# Patient Record
Sex: FEMALE | Race: WHITE | NOT HISPANIC OR LATINO
[De-identification: names, ages, dates, MRNs, and addresses within clinical notes are randomized per-mention and may not be internally consistent; named-entity substitution may affect disease eponyms.]

---
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
## 2024-03-15 ENCOUNTER — OFFICE (OUTPATIENT)
Dept: URBAN - METROPOLITAN AREA CLINIC 109 | Facility: CLINIC | Age: 24
Setting detail: OPHTHALMOLOGY
End: 2024-03-15
Payer: COMMERCIAL

## 2024-03-15 DIAGNOSIS — H10.9: ICD-10-CM

## 2024-03-15 DIAGNOSIS — H16.221: ICD-10-CM

## 2024-03-15 PROCEDURE — 92004 COMPRE OPH EXAM NEW PT 1/>: CPT | Performed by: OPHTHALMOLOGY

## 2024-03-15 ASSESSMENT — REFRACTION_MANIFEST
OD_AXIS: 40
OD_CYLINDER: -1.75
OD_VA1: 20/20
OD_SPHERE: -6.00

## 2024-03-15 ASSESSMENT — REFRACTION_CURRENTRX
OD_OVR_VA: 20/
OD_AXIS: 21
OS_SPHERE: -7.00
OS_AXIS: 155
OS_OVR_VA: 20/
OD_SPHERE: -6.50
OD_CYLINDER: -1.50
OS_CYLINDER: -1.75

## 2024-03-15 ASSESSMENT — SPHEQUIV_DERIVED: OD_SPHEQUIV: -6.875

## 2024-03-15 ASSESSMENT — LID EXAM ASSESSMENTS: OD_COMMENTS: NO FOREIGN BODY WITH LID EVERTED

## 2024-04-15 ENCOUNTER — OFFICE (OUTPATIENT)
Dept: URBAN - METROPOLITAN AREA CLINIC 12 | Facility: CLINIC | Age: 24
Setting detail: OPHTHALMOLOGY
End: 2024-04-15
Payer: COMMERCIAL

## 2024-04-15 DIAGNOSIS — H16.223: ICD-10-CM

## 2024-04-15 DIAGNOSIS — H10.9: ICD-10-CM

## 2024-04-15 PROCEDURE — 99213 OFFICE O/P EST LOW 20 MIN: CPT | Performed by: STUDENT IN AN ORGANIZED HEALTH CARE EDUCATION/TRAINING PROGRAM

## 2024-04-15 ASSESSMENT — LID EXAM ASSESSMENTS: OD_COMMENTS: NO FOREIGN BODY WITH LID EVERTED

## 2024-08-20 ENCOUNTER — NON-APPOINTMENT (OUTPATIENT)
Age: 24
End: 2024-08-20

## 2024-08-20 ENCOUNTER — APPOINTMENT (OUTPATIENT)
Dept: OBGYN | Facility: CLINIC | Age: 24
End: 2024-08-20
Payer: MEDICAID

## 2024-08-20 VITALS
DIASTOLIC BLOOD PRESSURE: 70 MMHG | WEIGHT: 135 LBS | SYSTOLIC BLOOD PRESSURE: 118 MMHG | HEIGHT: 63 IN | BODY MASS INDEX: 23.92 KG/M2

## 2024-08-20 DIAGNOSIS — Z30.9 ENCOUNTER FOR CONTRACEPTIVE MANAGEMENT, UNSPECIFIED: ICD-10-CM

## 2024-08-20 DIAGNOSIS — N94.89 OTHER SPECIFIED CONDITIONS ASSOCIATED WITH FEMALE GENITAL ORGANS AND MENSTRUAL CYCLE: ICD-10-CM

## 2024-08-20 LAB
HCG UR QL: NEGATIVE
QUALITY CONTROL: YES

## 2024-08-20 PROCEDURE — 99213 OFFICE O/P EST LOW 20 MIN: CPT

## 2024-08-20 PROCEDURE — 81025 URINE PREGNANCY TEST: CPT

## 2024-08-20 RX ORDER — DESOGESTREL/ETHINYL ESTRADIOL AND ETHINYL ESTRADIOL 21-5 (28)
0.15-0.02/0.01 KIT ORAL
Qty: 3 | Refills: 1 | Status: ACTIVE | COMMUNITY
Start: 2024-08-20 | End: 1900-01-01

## 2024-08-22 LAB
APPEARANCE: CLEAR
BILIRUBIN URINE: NEGATIVE
BLOOD URINE: NEGATIVE
COLOR: YELLOW
GLUCOSE QUALITATIVE U: NEGATIVE
KETONES URINE: NEGATIVE
LEUKOCYTE ESTERASE URINE: NEGATIVE
NITRITE URINE: NEGATIVE
PH URINE: 6
PROTEIN URINE: NEGATIVE
SPECIFIC GRAVITY URINE: >=1.03
UROBILINOGEN URINE: 0.2 (ref 0.2–?)

## 2024-08-27 PROBLEM — N94.89 VAGINAL BURNING: Status: RESOLVED | Noted: 2020-01-20 | Resolved: 2024-08-27

## 2024-08-27 NOTE — HISTORY OF PRESENT ILLNESS
[Patient reported PAP Smear was normal] : Patient reported PAP Smear was normal [Y] : Patient is sexually active [N] : Patient denies prior pregnancies [Menarche Age: ____] : age at menarche was [unfilled] [No] : Patient does not have concerns regarding sex [Currently Active] : currently active [Men] : men [PapSmeardate] : 03/2024 [GonorrheaDate] : 01/20/20 [TextBox_63] : neg [ChlamydiaDate] : 01/20/20 [TextBox_68] : neg [HPVDate] : 03/2024 [TextBox_78] : neg [LMPDate] : 07/30/24 [PGHxTotal] : 0 [FreeTextEntry1] : 07/30/24

## 2024-08-27 NOTE — HISTORY OF PRESENT ILLNESS
[Patient reported PAP Smear was normal] : Patient reported PAP Smear was normal [Y] : Patient is sexually active [N] : Patient denies prior pregnancies [Menarche Age: ____] : age at menarche was [unfilled] [No] : Patient does not have concerns regarding sex [Currently Active] : currently active [Men] : men [PapSmeardate] : 03/2024 [GonorrheaDate] : 01/20/20 [ChlamydiaDate] : 01/20/20 [TextBox_63] : neg [TextBox_68] : neg [HPVDate] : 03/2024 [TextBox_78] : neg [LMPDate] : 07/30/24 [PGHxTotal] : 0 [FreeTextEntry1] : 07/30/24

## 2024-08-27 NOTE — DISCUSSION/SUMMARY
[FreeTextEntry1] : Pt will restart OBC's, will monitor symptoms.  Pt to RTO for annual 3/2025, sooner if needed.  All the pt's questions/concerns were addressed.

## 2024-08-27 NOTE — COUNSELING
[Contraception/ Emergency Contraception/ Safe Sexual Practices] : contraception, emergency contraception, safe sexual practices [Nutrition/ Exercise/ Weight Management] : nutrition, exercise, weight management [Vitamins/Supplements] : vitamins/supplements [Sunscreen] : sunscreen

## 2025-02-11 ENCOUNTER — RX RENEWAL (OUTPATIENT)
Age: 25
End: 2025-02-11

## 2025-02-11 RX ORDER — DESOGESTREL/ETHINYL ESTRADIOL AND ETHINYL ESTRADIOL 21-5 (28)
0.15-0.02/0.01 KIT ORAL
Qty: 56 | Refills: 0 | Status: ACTIVE | COMMUNITY
Start: 2025-02-11

## 2025-04-12 ENCOUNTER — APPOINTMENT (OUTPATIENT)
Dept: OBGYN | Facility: CLINIC | Age: 25
End: 2025-04-12

## 2025-08-02 ENCOUNTER — RX RENEWAL (OUTPATIENT)
Age: 25
End: 2025-08-02